# Patient Record
Sex: FEMALE
[De-identification: names, ages, dates, MRNs, and addresses within clinical notes are randomized per-mention and may not be internally consistent; named-entity substitution may affect disease eponyms.]

---

## 2024-09-06 PROBLEM — Z00.00 ENCOUNTER FOR PREVENTIVE HEALTH EXAMINATION: Status: ACTIVE | Noted: 2024-09-06

## 2024-09-09 ENCOUNTER — APPOINTMENT (OUTPATIENT)
Dept: ANTEPARTUM | Facility: CLINIC | Age: 19
End: 2024-09-09

## 2025-02-24 ENCOUNTER — INPATIENT (INPATIENT)
Facility: HOSPITAL | Age: 20
LOS: 2 days | Discharge: HOME | End: 2025-02-27
Attending: OBSTETRICS & GYNECOLOGY | Admitting: OBSTETRICS & GYNECOLOGY
Payer: COMMERCIAL

## 2025-02-24 VITALS
WEIGHT: 195.11 LBS | DIASTOLIC BLOOD PRESSURE: 82 MMHG | TEMPERATURE: 99 F | SYSTOLIC BLOOD PRESSURE: 133 MMHG | HEIGHT: 66 IN | HEART RATE: 67 BPM | RESPIRATION RATE: 18 BRPM

## 2025-02-24 DIAGNOSIS — Z34.90 ENCOUNTER FOR SUPERVISION OF NORMAL PREGNANCY, UNSPECIFIED, UNSPECIFIED TRIMESTER: ICD-10-CM

## 2025-02-24 DIAGNOSIS — O26.899 OTHER SPECIFIED PREGNANCY RELATED CONDITIONS, UNSPECIFIED TRIMESTER: ICD-10-CM

## 2025-02-24 DIAGNOSIS — Z34.80 ENCOUNTER FOR SUPERVISION OF OTHER NORMAL PREGNANCY, UNSPECIFIED TRIMESTER: ICD-10-CM

## 2025-02-24 LAB
APTT BLD: 23.8 SEC — LOW (ref 24.5–35.6)
BASOPHILS # BLD AUTO: 0 K/UL — SIGNIFICANT CHANGE UP (ref 0–0.2)
BASOPHILS NFR BLD AUTO: 0 % — SIGNIFICANT CHANGE UP (ref 0–2)
BLD GP AB SCN SERPL QL: SIGNIFICANT CHANGE UP
EOSINOPHIL # BLD AUTO: 0 K/UL — SIGNIFICANT CHANGE UP (ref 0–0.5)
EOSINOPHIL NFR BLD AUTO: 0 % — SIGNIFICANT CHANGE UP (ref 0–6)
FIBRINOGEN PPP-MCNC: 501 MG/DL — HIGH (ref 200–475)
HCT VFR BLD CALC: 34.8 % — SIGNIFICANT CHANGE UP (ref 34.5–45)
HGB BLD-MCNC: 11.2 G/DL — LOW (ref 11.5–15.5)
INR BLD: 0.89 RATIO — SIGNIFICANT CHANGE UP (ref 0.85–1.16)
LYMPHOCYTES # BLD AUTO: 1.78 K/UL — SIGNIFICANT CHANGE UP (ref 1–3.3)
LYMPHOCYTES # BLD AUTO: 18 % — SIGNIFICANT CHANGE UP (ref 13–44)
MANUAL SMEAR VERIFICATION: SIGNIFICANT CHANGE UP
MCHC RBC-ENTMCNC: 28.2 PG — SIGNIFICANT CHANGE UP (ref 27–34)
MCHC RBC-ENTMCNC: 32.2 G/DL — SIGNIFICANT CHANGE UP (ref 32–36)
MCV RBC AUTO: 87.7 FL — SIGNIFICANT CHANGE UP (ref 80–100)
MONOCYTES # BLD AUTO: 1.09 K/UL — HIGH (ref 0–0.9)
MONOCYTES NFR BLD AUTO: 11 % — SIGNIFICANT CHANGE UP (ref 2–14)
NEUTROPHILS # BLD AUTO: 7.02 K/UL — SIGNIFICANT CHANGE UP (ref 1.8–7.4)
NEUTROPHILS NFR BLD AUTO: 71 % — SIGNIFICANT CHANGE UP (ref 43–77)
NRBC # BLD: 0 /100 WBCS — SIGNIFICANT CHANGE UP (ref 0–0)
NRBC BLD-RTO: 0 /100 WBCS — SIGNIFICANT CHANGE UP (ref 0–0)
PLAT MORPH BLD: NORMAL — SIGNIFICANT CHANGE UP
PLATELET # BLD AUTO: 231 K/UL — SIGNIFICANT CHANGE UP (ref 150–400)
PROTHROM AB SERPL-ACNC: 10.4 SEC — SIGNIFICANT CHANGE UP (ref 9.9–13.4)
RBC # BLD: 3.97 M/UL — SIGNIFICANT CHANGE UP (ref 3.8–5.2)
RBC # FLD: 13.3 % — SIGNIFICANT CHANGE UP (ref 10.3–14.5)
RBC BLD AUTO: NORMAL — SIGNIFICANT CHANGE UP
WBC # BLD: 9.89 K/UL — SIGNIFICANT CHANGE UP (ref 3.8–10.5)
WBC # FLD AUTO: 9.89 K/UL — SIGNIFICANT CHANGE UP (ref 3.8–10.5)

## 2025-02-24 RX ORDER — OXYTOCIN-SODIUM CHLORIDE 0.9% IV SOLN 30 UNIT/500ML 30-0.9/5 UT/ML-%
167 SOLUTION INTRAVENOUS
Qty: 30 | Refills: 0 | Status: COMPLETED | OUTPATIENT
Start: 2025-02-24

## 2025-02-24 RX ORDER — SODIUM CHLORIDE 9 G/1000ML
1000 INJECTION, SOLUTION INTRAVENOUS
Refills: 0 | Status: DISCONTINUED | OUTPATIENT
Start: 2025-02-24 | End: 2025-02-26

## 2025-02-24 RX ORDER — INFLUENZA A VIRUS A/IDAHO/07/2018 (H1N1) ANTIGEN (MDCK CELL DERIVED, PROPIOLACTONE INACTIVATED, INFLUENZA A VIRUS A/INDIANA/08/2018 (H3N2) ANTIGEN (MDCK CELL DERIVED, PROPIOLACTONE INACTIVATED), INFLUENZA B VIRUS B/SINGAPORE/INFTT-16-0610/2016 ANTIGEN (MDCK CELL DERIVED, PROPIOLACTONE INACTIVATED), INFLUENZA B VIRUS B/IOWA/06/2017 ANTIGEN (MDCK CELL DERIVED, PROPIOLACTONE INACTIVATED) 15; 15; 15; 15 UG/.5ML; UG/.5ML; UG/.5ML; UG/.5ML
0.5 INJECTION, SUSPENSION INTRAMUSCULAR ONCE
Refills: 0 | Status: DISCONTINUED | OUTPATIENT
Start: 2025-02-24 | End: 2025-02-27

## 2025-02-24 RX ORDER — CITRIC ACID/SODIUM CITRATE 300-500 MG
15 SOLUTION, ORAL ORAL EVERY 6 HOURS
Refills: 0 | Status: DISCONTINUED | OUTPATIENT
Start: 2025-02-24 | End: 2025-02-26

## 2025-02-24 RX ORDER — AMPICILLIN SODIUM 1 G/1
2 INJECTION, POWDER, FOR SOLUTION INTRAMUSCULAR; INTRAVENOUS ONCE
Refills: 0 | Status: COMPLETED | OUTPATIENT
Start: 2025-02-24 | End: 2025-02-25

## 2025-02-24 RX ORDER — AMPICILLIN SODIUM 1 G/1
1 INJECTION, POWDER, FOR SOLUTION INTRAMUSCULAR; INTRAVENOUS EVERY 4 HOURS
Refills: 0 | Status: DISCONTINUED | OUTPATIENT
Start: 2025-02-24 | End: 2025-02-26

## 2025-02-24 NOTE — OB RN PATIENT PROFILE - NS TRANSFER PATIENT BELONGINGS
HUB to read    PA was denied for FreeStyle Bonifacio 2 Sensor    FREESTYLE BONIFACIO 2 continuous glucose monitor system (, transmitter, and sensor) is  denied for not meeting the prior authorization requirement(s). Product authorization requires  the following:  (1) One of the following:  (A) You require multiple (three or more) daily doses of insulin.  (B) You require treatment with a continuous subcutaneous insulin infusion pump   None

## 2025-02-24 NOTE — OB PROVIDER H&P - PROBLEM SELECTOR PLAN 1
-admit   -informed consent signed   -admission labs ordered   -pain mgmt per request    -continue close fetal maternal monitoring  -case d/w and NST reviewed Dr. Barahona

## 2025-02-24 NOTE — OB RN PATIENT PROFILE - THE IMPORTANCE OF THE NEWBORN'S COMFORT AND THERMOREGULATION DURING SKIN TO SKIN: ANY PART OF INFANT SKIN NOT TOUCHING PARENT'S SKIN IS TO BE COVERED BY A BLANKET.
Pc with pt pain is about the same. Discussed xray results.    Patient refused to answer the question. Statement Selected

## 2025-02-24 NOTE — OB PROVIDER H&P - HISTORY OF PRESENT ILLNESS
Supervised by CLIFF Diaz.    20 y/o  at 40.1 wks LMP 24 SAVANNAH 25 presents to L&D for scheduled induction of labor for post-dates. Pt states to feel well, offers no acute complaints. Pt admits to +FM. Denies contractions, leakage of fluids and vaginal bleeding.   Last meal: 3PM-- sandwich, banana pudding, coke    PNC: [facility], anemia in pregnancy  POb/GynHx:   -g1 TOP  D&C  -denies fibroid, ovaries cysts, h/o STD's     Allergies: NKA  Meds: PNV, iron  PMHx: anemia  PSHx: D&C   PsocHx: denies smoking/ETOH use/drug use   Psych: h/o anxiety and depression, on meds prior to pregnancy

## 2025-02-24 NOTE — OB PROVIDER H&P - NSHPPHYSICALEXAM_GEN_ALL_CORE
Gen: AAOx3, NAD, resting comfortably   Abd: soft, NT, gravid  Ext: no edema  FH: baseline 130, moderate variability, +accels, no decels  Bock: irreg     SVE: 0/0/-3/vtx/int    Bedside sono: cephalic

## 2025-02-24 NOTE — OB RN PATIENT PROFILE - NSSDOHFOODLAST_OBGYN_A_OB
DISPLAY PLAN FREE TEXT DISPLAY PLAN FREE TEXT DISPLAY PLAN FREE TEXT DISPLAY PLAN FREE TEXT never true

## 2025-02-24 NOTE — OB PROVIDER H&P - ASSESSMENT
Left a message that this is a follow up on reduction strategy and medication use. Left my name Kristyn Remy and phone number [phone].     
A/P: 20 y/o  here for scheduled induction of labor for post-dates  stable, without complaints at this time    -admit   -informed consent signed   -admission labs ordered   -pain mgmt per request    -continue close fetal maternal monitoring  -case d/w and NST reviewed Dr. Barahona

## 2025-02-25 RX ORDER — OXYTOCIN-SODIUM CHLORIDE 0.9% IV SOLN 30 UNIT/500ML 30-0.9/5 UT/ML-%
SOLUTION INTRAVENOUS
Qty: 30 | Refills: 0 | Status: DISCONTINUED | OUTPATIENT
Start: 2025-02-25 | End: 2025-02-26

## 2025-02-25 RX ADMIN — AMPICILLIN SODIUM 100 GRAM(S): 1 INJECTION, POWDER, FOR SOLUTION INTRAMUSCULAR; INTRAVENOUS at 23:53

## 2025-02-25 RX ADMIN — AMPICILLIN SODIUM 100 GRAM(S): 1 INJECTION, POWDER, FOR SOLUTION INTRAMUSCULAR; INTRAVENOUS at 15:06

## 2025-02-25 RX ADMIN — OXYTOCIN-SODIUM CHLORIDE 0.9% IV SOLN 30 UNIT/500ML 2 MILLIUNIT(S)/MIN: 30-0.9/5 SOLUTION at 22:07

## 2025-02-25 RX ADMIN — AMPICILLIN SODIUM 100 GRAM(S): 1 INJECTION, POWDER, FOR SOLUTION INTRAMUSCULAR; INTRAVENOUS at 19:46

## 2025-02-25 RX ADMIN — AMPICILLIN SODIUM 200 GRAM(S): 1 INJECTION, POWDER, FOR SOLUTION INTRAMUSCULAR; INTRAVENOUS at 11:19

## 2025-02-25 NOTE — OB PROVIDER LABOR PROGRESS NOTE - ASSESSMENT
18y/o  at 40.2wks 18y/o  at 40.2wks    plan - start pitocin   epidural in place and working  Dr. starkey was present in room.

## 2025-02-25 NOTE — OB PROVIDER LABOR PROGRESS NOTE - ASSESSMENT
20 y/o  at 40.1, here for scheduled IOL for post dates.     - s/p buccal cytotec #2 given   - continue buccal cytotec as per protocol  - continue close fetal maternal monitoring    - d/w Dr. Barahona  18 y/o  at 40.1, here for scheduled IOL for post dates. GBS+    - s/p buccal cytotec #2 given   - continue buccal cytotec as per protocol  - continue close fetal maternal monitoring    - Ampicillin for GBS prophylaxis  - d/w Dr. Barahona  18 y/o  at 40.2, here for scheduled IOL for post dates. GBS+    - s/p buccal cytotec #2 given   - continue buccal cytotec as per protocol  - continue close fetal maternal monitoring    - Ampicillin for GBS prophylaxis  - d/w Dr. Barahona

## 2025-02-25 NOTE — OB PROVIDER LABOR PROGRESS NOTE - ASSESSMENT
18 y/o  at 40.2, admitted for scheduled IOL for post dates. GBS+  cervical balloon in place     - continue buccal cytotec as per protocol  - continue close fetal maternal monitoring    - Ampicillin for GBS prophylaxis  - pain management per patient request   - NST reviewed   - d/w Dr. Toure, house attending

## 2025-02-25 NOTE — OB PROVIDER LABOR PROGRESS NOTE - ASSESSMENT
a/p: 20 y/o  at 40.1 here for scheduled IOL  stable     s/p buccal cytotec #1 given   continue close fetal maternal monitoring    d/w Dr. Barahona

## 2025-02-25 NOTE — OB PROVIDER LABOR PROGRESS NOTE - ASSESSMENT
20 y/o  at 40.2, admitted for scheduled IOL for post dates. GBS+  patient agreeable to cervical balloon placement but would like to eat first     - continue buccal cytotec as per protocol, if contractions space out   - continue close fetal maternal monitoring    - Ampicillin for GBS prophylaxis  - pain management per patient request   - will return for cervical balloon placement   - NST reviewed   - d/w Dr. Toure, house attending

## 2025-02-25 NOTE — OB PROVIDER LABOR PROGRESS NOTE - ASSESSMENT
20 y/o  at 40.2, admitted for scheduled IOL for post dates. GBS+  cervical balloon in place     - continue buccal cytotec as per protocol  - continue close fetal maternal monitoring    - Ampicillin for GBS prophylaxis  - pain management with epidural   - NST reviewed   - d/w Dr. Toure, house attending

## 2025-02-26 LAB
BASOPHILS # BLD AUTO: 0.03 K/UL — SIGNIFICANT CHANGE UP (ref 0–0.2)
BASOPHILS NFR BLD AUTO: 0.2 % — SIGNIFICANT CHANGE UP (ref 0–2)
EOSINOPHIL # BLD AUTO: 0.01 K/UL — SIGNIFICANT CHANGE UP (ref 0–0.5)
EOSINOPHIL NFR BLD AUTO: 0.1 % — SIGNIFICANT CHANGE UP (ref 0–6)
HCT VFR BLD CALC: 28.8 % — LOW (ref 34.5–45)
HGB BLD-MCNC: 9.6 G/DL — LOW (ref 11.5–15.5)
IMM GRANULOCYTES NFR BLD AUTO: 1 % — HIGH (ref 0–0.9)
LYMPHOCYTES # BLD AUTO: 1.57 K/UL — SIGNIFICANT CHANGE UP (ref 1–3.3)
LYMPHOCYTES # BLD AUTO: 8.6 % — LOW (ref 13–44)
MCHC RBC-ENTMCNC: 29.2 PG — SIGNIFICANT CHANGE UP (ref 27–34)
MCHC RBC-ENTMCNC: 33.3 G/DL — SIGNIFICANT CHANGE UP (ref 32–36)
MCV RBC AUTO: 87.5 FL — SIGNIFICANT CHANGE UP (ref 80–100)
MONOCYTES # BLD AUTO: 1.84 K/UL — HIGH (ref 0–0.9)
MONOCYTES NFR BLD AUTO: 10.1 % — SIGNIFICANT CHANGE UP (ref 2–14)
NEUTROPHILS # BLD AUTO: 14.64 K/UL — HIGH (ref 1.8–7.4)
NEUTROPHILS NFR BLD AUTO: 80 % — HIGH (ref 43–77)
NRBC BLD AUTO-RTO: 0 /100 WBCS — SIGNIFICANT CHANGE UP (ref 0–0)
PLATELET # BLD AUTO: 224 K/UL — SIGNIFICANT CHANGE UP (ref 150–400)
RBC # BLD: 3.29 M/UL — LOW (ref 3.8–5.2)
RBC # FLD: 13.7 % — SIGNIFICANT CHANGE UP (ref 10.3–14.5)
T PALLIDUM AB TITR SER: NEGATIVE — SIGNIFICANT CHANGE UP
WBC # BLD: 18.28 K/UL — HIGH (ref 3.8–10.5)
WBC # FLD AUTO: 18.28 K/UL — HIGH (ref 3.8–10.5)

## 2025-02-26 RX ORDER — IBUPROFEN 200 MG
600 TABLET ORAL EVERY 6 HOURS
Refills: 0 | Status: COMPLETED | OUTPATIENT
Start: 2025-02-26 | End: 2026-01-25

## 2025-02-26 RX ORDER — SIMETHICONE 80 MG
80 TABLET,CHEWABLE ORAL EVERY 4 HOURS
Refills: 0 | Status: DISCONTINUED | OUTPATIENT
Start: 2025-02-26 | End: 2025-02-27

## 2025-02-26 RX ORDER — FERROUS SULFATE 137(45) MG
325 TABLET, EXTENDED RELEASE ORAL THREE TIMES A DAY
Refills: 0 | Status: DISCONTINUED | OUTPATIENT
Start: 2025-02-26 | End: 2025-02-27

## 2025-02-26 RX ORDER — MAGNESIUM HYDROXIDE 400 MG/5ML
30 SUSPENSION ORAL DAILY
Refills: 0 | Status: DISCONTINUED | OUTPATIENT
Start: 2025-02-26 | End: 2025-02-27

## 2025-02-26 RX ORDER — KETOROLAC TROMETHAMINE 30 MG/ML
30 INJECTION, SOLUTION INTRAMUSCULAR; INTRAVENOUS ONCE
Refills: 0 | Status: DISCONTINUED | OUTPATIENT
Start: 2025-02-26 | End: 2025-02-26

## 2025-02-26 RX ORDER — OXYTOCIN-SODIUM CHLORIDE 0.9% IV SOLN 30 UNIT/500ML 30-0.9/5 UT/ML-%
167 SOLUTION INTRAVENOUS
Qty: 30 | Refills: 0 | Status: COMPLETED | OUTPATIENT
Start: 2025-02-26 | End: 2025-02-26

## 2025-02-26 RX ORDER — HYDROCORTISONE 10 MG/G
1 CREAM TOPICAL EVERY 6 HOURS
Refills: 0 | Status: DISCONTINUED | OUTPATIENT
Start: 2025-02-26 | End: 2025-02-27

## 2025-02-26 RX ORDER — DIBUCAINE 10 MG/G
1 OINTMENT TOPICAL EVERY 6 HOURS
Refills: 0 | Status: DISCONTINUED | OUTPATIENT
Start: 2025-02-26 | End: 2025-02-27

## 2025-02-26 RX ORDER — BISACODYL 5 MG
5 TABLET, DELAYED RELEASE (ENTERIC COATED) ORAL EVERY 12 HOURS
Refills: 0 | Status: DISCONTINUED | OUTPATIENT
Start: 2025-02-26 | End: 2025-02-27

## 2025-02-26 RX ORDER — ACETAMINOPHEN 500 MG/5ML
975 LIQUID (ML) ORAL
Refills: 0 | Status: DISCONTINUED | OUTPATIENT
Start: 2025-02-26 | End: 2025-02-27

## 2025-02-26 RX ORDER — PRAMOXINE HCL 1 %
1 GEL (GRAM) TOPICAL EVERY 4 HOURS
Refills: 0 | Status: DISCONTINUED | OUTPATIENT
Start: 2025-02-26 | End: 2025-02-27

## 2025-02-26 RX ORDER — MODIFIED LANOLIN 100 %
1 CREAM (GRAM) TOPICAL EVERY 6 HOURS
Refills: 0 | Status: DISCONTINUED | OUTPATIENT
Start: 2025-02-26 | End: 2025-02-27

## 2025-02-26 RX ORDER — MAGNESIUM HYDROXIDE 400 MG/5ML
30 SUSPENSION ORAL
Refills: 0 | Status: DISCONTINUED | OUTPATIENT
Start: 2025-02-26 | End: 2025-02-27

## 2025-02-26 RX ORDER — OXYTOCIN-SODIUM CHLORIDE 0.9% IV SOLN 30 UNIT/500ML 30-0.9/5 UT/ML-%
167 SOLUTION INTRAVENOUS
Qty: 30 | Refills: 0 | Status: DISCONTINUED | OUTPATIENT
Start: 2025-02-26 | End: 2025-02-27

## 2025-02-26 RX ORDER — WITCH HAZEL LEAF
1 FLUID EXTRACT MISCELLANEOUS EVERY 4 HOURS
Refills: 0 | Status: DISCONTINUED | OUTPATIENT
Start: 2025-02-26 | End: 2025-02-27

## 2025-02-26 RX ORDER — CLOSTRIDIUM TETANI TOXOID ANTIGEN (FORMALDEHYDE INACTIVATED), CORYNEBACTERIUM DIPHTHERIAE TOXOID ANTIGEN (FORMALDEHYDE INACTIVATED), BORDETELLA PERTUSSIS TOXOID ANTIGEN (GLUTARALDEHYDE INACTIVATED), BORDETELLA PERTUSSIS FILAMENTOUS HEMAGGLUTININ ANTIGEN (FORMALDEHYDE INACTIVATED), BORDETELLA PERTUSSIS PERTACTIN ANTIGEN, AND BORDETELLA PERTUSSIS FIMBRIAE 2/3 ANTIGEN 5; 2; 2.5; 5; 3; 5 [LF]/.5ML; [LF]/.5ML; UG/.5ML; UG/.5ML; UG/.5ML; UG/.5ML
0.5 INJECTION, SUSPENSION INTRAMUSCULAR ONCE
Refills: 0 | Status: DISCONTINUED | OUTPATIENT
Start: 2025-02-26 | End: 2025-02-27

## 2025-02-26 RX ORDER — PRENATAL 136/IRON/FOLIC ACID 27 MG-1 MG
1 TABLET ORAL DAILY
Refills: 0 | Status: DISCONTINUED | OUTPATIENT
Start: 2025-02-26 | End: 2025-02-27

## 2025-02-26 RX ORDER — DIPHENHYDRAMINE HCL 12.5MG/5ML
25 ELIXIR ORAL EVERY 6 HOURS
Refills: 0 | Status: DISCONTINUED | OUTPATIENT
Start: 2025-02-26 | End: 2025-02-27

## 2025-02-26 RX ORDER — IBUPROFEN 200 MG
600 TABLET ORAL EVERY 6 HOURS
Refills: 0 | Status: DISCONTINUED | OUTPATIENT
Start: 2025-02-26 | End: 2025-02-27

## 2025-02-26 RX ORDER — BENZOCAINE 220 MG/G
1 SPRAY, METERED PERIODONTAL EVERY 6 HOURS
Refills: 0 | Status: DISCONTINUED | OUTPATIENT
Start: 2025-02-26 | End: 2025-02-27

## 2025-02-26 RX ADMIN — AMPICILLIN SODIUM 100 GRAM(S): 1 INJECTION, POWDER, FOR SOLUTION INTRAMUSCULAR; INTRAVENOUS at 08:04

## 2025-02-26 RX ADMIN — Medication 1 APPLICATION(S): at 13:53

## 2025-02-26 RX ADMIN — Medication 975 MILLIGRAM(S): at 22:05

## 2025-02-26 RX ADMIN — HYDROCORTISONE 1 APPLICATION(S): 10 CREAM TOPICAL at 13:52

## 2025-02-26 RX ADMIN — KETOROLAC TROMETHAMINE 30 MILLIGRAM(S): 30 INJECTION, SOLUTION INTRAMUSCULAR; INTRAVENOUS at 10:51

## 2025-02-26 RX ADMIN — Medication 3 MILLILITER(S): at 14:21

## 2025-02-26 RX ADMIN — OXYTOCIN-SODIUM CHLORIDE 0.9% IV SOLN 30 UNIT/500ML 167 MILLIUNIT(S)/MIN: 30-0.9/5 SOLUTION at 09:54

## 2025-02-26 RX ADMIN — Medication 500 MILLIGRAM(S): at 23:12

## 2025-02-26 RX ADMIN — Medication 325 MILLIGRAM(S): at 13:54

## 2025-02-26 RX ADMIN — BENZOCAINE 1 SPRAY(S): 220 SPRAY, METERED PERIODONTAL at 13:52

## 2025-02-26 RX ADMIN — Medication 5 MILLIGRAM(S): at 18:52

## 2025-02-26 RX ADMIN — Medication 1 APPLICATION(S): at 13:52

## 2025-02-26 RX ADMIN — AMPICILLIN SODIUM 100 GRAM(S): 1 INJECTION, POWDER, FOR SOLUTION INTRAMUSCULAR; INTRAVENOUS at 03:58

## 2025-02-26 RX ADMIN — Medication 1 TABLET(S): at 13:54

## 2025-02-26 RX ADMIN — KETOROLAC TROMETHAMINE 30 MILLIGRAM(S): 30 INJECTION, SOLUTION INTRAMUSCULAR; INTRAVENOUS at 10:36

## 2025-02-26 RX ADMIN — Medication 325 MILLIGRAM(S): at 23:12

## 2025-02-26 RX ADMIN — DIBUCAINE 1 APPLICATION(S): 10 OINTMENT TOPICAL at 13:53

## 2025-02-26 RX ADMIN — Medication 500 MILLIGRAM(S): at 13:54

## 2025-02-26 RX ADMIN — MAGNESIUM HYDROXIDE 30 MILLILITER(S): 400 SUSPENSION ORAL at 13:52

## 2025-02-26 RX ADMIN — Medication 3 MILLILITER(S): at 22:44

## 2025-02-26 RX ADMIN — Medication 600 MILLIGRAM(S): at 23:11

## 2025-02-26 RX ADMIN — Medication 975 MILLIGRAM(S): at 21:05

## 2025-02-26 NOTE — OB PROVIDER LABOR PROGRESS NOTE - NS_SUBJECTIVE/OBJECTIVE_OBGYN_ALL_OB_FT
Cooks balloon placed   80cc/80cc sterile saline in uterine and vaginal balloon   patient tolerated exam well   will continue to monitor     FHT: baseline: 140, moderate variability, + accels, no decels  q 2-3 minutes
Nst: moderate variability accels no decels  130  Pt resting in bed  toco- ctx q 3 min
Patient evaluated at bedside   Resting comfortably with epidural in place   cervical balloon still in place     VE deferred
Patient reevaluated at bedside c/o pressure and pain  pitocin at 10mu  /66  sve 7/100/-1/vtx/iupc in place  nst baseline 140, moderate variability, early decel  toco irregular
Supervised by CLIFF amaro tracing note
routine ob huddle    20yo  induced for dates    last vag exam: /-1/vtx     arom at 0118 meconium stained    pt has iupc    pit at 12mu    gbs + on iv amp    cat I fetal tracing    toco via iupc     - due for vag exam
Pt resting on left lateral with peanut ball feeling some pain to rt side.    fhrt-130 moderate variability accels no decels    cx- 6/70/-3/vtx/int  arom - moderate mec  iupc placed
pt reports some cramps
Patient evaluated at bedside   resting comfortably   no complaints at this time     cervical balloon in place  VE deferred
Tracing Note
pt comfortable
Supervised by CLIFF Barnett     Pt was seen at bedside, lying uncomfortably in labor pains    SVE: 10/100/+2
Patient evaluated at bedside   resting comfortably   reports some contractions pain   otherwise no new complaints     SVE: 1-2/70/-3  patient tolerated exam well
pt resting in bed offers no complains  pt has a cook balloon in place  fhrt- moderate variabililty accels no decels  125  cx- 5.5/70/-2/vtx/int  balloon was out  toco- ctx irregular

## 2025-02-26 NOTE — CHART NOTE - NSCHARTNOTEFT_GEN_A_CORE
ATTENDING NOTE    Pt is examined because she is having category ii tracing.  cx:9/100%/vtx/-2-3/ruptured earlier with meconium fluid  ucs q 2-3 min;pitocin is running.  category II tracing however with good variability  efw about 4000gms  A/P 20 yo primigravida at 40+ weeks gestation in active labour almost in second stage.  discussed with patient and  the possibility of a c/s if she does not progress or if the tracing persists to be category ii  Continue maternal/fetal surveillance

## 2025-02-26 NOTE — OB PROVIDER LABOR PROGRESS NOTE - NS_OBIHICONTRACTIONPATTERNDETAILS_OBGYN_ALL_OB_FT
ctx none
q 2-3 min mild
q 2-3 minutes
ctx none
q 2 mins
3 min
q 2-3
q1-2
q 5-6 mins
q 1-2 minutes
irregular

## 2025-02-26 NOTE — OB PROVIDER LABOR PROGRESS NOTE - ASSESSMENT
med iol for  term pregnancy    plan- continue pitocin titrate up til ctx q 2- 3 min   next exam AROM poss iupc  discussed with Dr. Fishman

## 2025-02-26 NOTE — OB RN DELIVERY SUMMARY - NS_RESUSCITMEDS_OBGYN_ALL_OB
----- Message from Isael Forbes sent at 8/2/2017  9:15 AM CDT -----  Contact: self [phone]  Patient would like a call back from the office , stated it's personal . Please advise , Thanks !    No

## 2025-02-26 NOTE — OB RN DELIVERY SUMMARY - NS_CORDVESSELS_OBGYN_ALL_OB
Mother updated she will be transferred to [facility] in [place]   3

## 2025-02-26 NOTE — OB PROVIDER LABOR PROGRESS NOTE - NS_OBIHIFHRDETAILS_OBGYN_ALL_OB_FT
130s baseline mod variability
130
Cat 1 tracing  Baseline 130  Moderate variability  + accels, no decels
FHT: baseline: 130, moderate variability, + accels, no decels
baseline fhr 145  moderate variability   accels present   no decels
FHT: baseline: 140, moderate variability, + accels, no decels
cat I
125
130
FHT: baseline: 130, moderate variability, + accels, no decels
baseline fhr: 135  moderate variability   accels present   no decels

## 2025-02-26 NOTE — OB PROVIDER LABOR PROGRESS NOTE - ASSESSMENT
med iol at 40.2wks    plan continue pitocin titration up to 210 mvu    IUPC placed without difficulty    Dr. Fishman made aware

## 2025-02-26 NOTE — OB PROVIDER DELIVERY SUMMARY - NSPROVIDERDELIVERYNOTE_OBGYN_ALL_OB_FT
Delivered per vagina liveborn watson apgar 8/9.  Delayed cord clamping performed.  Cord for gas and cord blood obtained.  Placenta spontaneously  and delivered intact.  Uterus firm.  Third degree laceration repaired with chromic suture in layers.  Mother and baby stable.  Skin to skin initiated.

## 2025-02-26 NOTE — OB PROVIDER LABOR PROGRESS NOTE - ASSESSMENT
continue pitocin augmentation  reposition with peanut ball  venodynes  npo  continuous monitoring  anesthesia for top off  amp for gbs pOS  dw Dr. Fishman [place] attending

## 2025-02-26 NOTE — OB PROVIDER LABOR PROGRESS NOTE - ASSESSMENT
18 y/o  40.3 wks here for mIOL for postdates     -stable   -continue fetal/maternal monitoring   -d/w dr. Barahona   -anticipate vaginal delivery

## 2025-02-27 ENCOUNTER — TRANSCRIPTION ENCOUNTER (OUTPATIENT)
Age: 20
End: 2025-02-27

## 2025-02-27 VITALS
HEART RATE: 92 BPM | SYSTOLIC BLOOD PRESSURE: 107 MMHG | OXYGEN SATURATION: 98 % | RESPIRATION RATE: 18 BRPM | DIASTOLIC BLOOD PRESSURE: 67 MMHG | TEMPERATURE: 98 F

## 2025-02-27 LAB
BASOPHILS # BLD AUTO: 0.05 K/UL — SIGNIFICANT CHANGE UP (ref 0–0.2)
BASOPHILS NFR BLD AUTO: 0.3 % — SIGNIFICANT CHANGE UP (ref 0–2)
EOSINOPHIL # BLD AUTO: 0.1 K/UL — SIGNIFICANT CHANGE UP (ref 0–0.5)
EOSINOPHIL NFR BLD AUTO: 0.6 % — SIGNIFICANT CHANGE UP (ref 0–6)
HCT VFR BLD CALC: 29.3 % — LOW (ref 34.5–45)
HGB BLD-MCNC: 9.5 G/DL — LOW (ref 11.5–15.5)
IMM GRANULOCYTES NFR BLD AUTO: 1.9 % — HIGH (ref 0–0.9)
LYMPHOCYTES # BLD AUTO: 17.3 % — SIGNIFICANT CHANGE UP (ref 13–44)
LYMPHOCYTES # BLD AUTO: 2.88 K/UL — SIGNIFICANT CHANGE UP (ref 1–3.3)
MCHC RBC-ENTMCNC: 28.5 PG — SIGNIFICANT CHANGE UP (ref 27–34)
MCHC RBC-ENTMCNC: 32.4 G/DL — SIGNIFICANT CHANGE UP (ref 32–36)
MCV RBC AUTO: 88 FL — SIGNIFICANT CHANGE UP (ref 80–100)
MONOCYTES # BLD AUTO: 1.63 K/UL — HIGH (ref 0–0.9)
MONOCYTES NFR BLD AUTO: 9.8 % — SIGNIFICANT CHANGE UP (ref 2–14)
NEUTROPHILS # BLD AUTO: 11.7 K/UL — HIGH (ref 1.8–7.4)
NEUTROPHILS NFR BLD AUTO: 70.1 % — SIGNIFICANT CHANGE UP (ref 43–77)
NRBC BLD AUTO-RTO: 0 /100 WBCS — SIGNIFICANT CHANGE UP (ref 0–0)
PLATELET # BLD AUTO: 225 K/UL — SIGNIFICANT CHANGE UP (ref 150–400)
RBC # BLD: 3.33 M/UL — LOW (ref 3.8–5.2)
RBC # FLD: 13.6 % — SIGNIFICANT CHANGE UP (ref 10.3–14.5)
WBC # BLD: 16.68 K/UL — HIGH (ref 3.8–10.5)
WBC # FLD AUTO: 16.68 K/UL — HIGH (ref 3.8–10.5)

## 2025-02-27 PROCEDURE — 86901 BLOOD TYPING SEROLOGIC RH(D): CPT

## 2025-02-27 PROCEDURE — 86923 COMPATIBILITY TEST ELECTRIC: CPT

## 2025-02-27 PROCEDURE — 85025 COMPLETE CBC W/AUTO DIFF WBC: CPT

## 2025-02-27 PROCEDURE — 90707 MMR VACCINE SC: CPT

## 2025-02-27 PROCEDURE — C1726: CPT

## 2025-02-27 PROCEDURE — 86780 TREPONEMA PALLIDUM: CPT

## 2025-02-27 PROCEDURE — 85384 FIBRINOGEN ACTIVITY: CPT

## 2025-02-27 PROCEDURE — 86900 BLOOD TYPING SEROLOGIC ABO: CPT

## 2025-02-27 PROCEDURE — 36415 COLL VENOUS BLD VENIPUNCTURE: CPT

## 2025-02-27 PROCEDURE — 86850 RBC ANTIBODY SCREEN: CPT

## 2025-02-27 PROCEDURE — 59050 FETAL MONITOR W/REPORT: CPT

## 2025-02-27 PROCEDURE — 85610 PROTHROMBIN TIME: CPT

## 2025-02-27 PROCEDURE — 85730 THROMBOPLASTIN TIME PARTIAL: CPT

## 2025-02-27 RX ORDER — ACETAMINOPHEN 500 MG/5ML
2 LIQUID (ML) ORAL
Qty: 30 | Refills: 0
Start: 2025-02-27 | End: 2025-03-05

## 2025-02-27 RX ORDER — PRENATAL 136/IRON/FOLIC ACID 27 MG-1 MG
1 TABLET ORAL
Qty: 30 | Refills: 2
Start: 2025-02-27 | End: 2025-05-27

## 2025-02-27 RX ORDER — DOCUSATE SODIUM 100 MG
1 CAPSULE ORAL
Qty: 28 | Refills: 0
Start: 2025-02-27 | End: 2025-03-12

## 2025-02-27 RX ORDER — MEASLES,MUMPS,RUBELLA LIVE VAC 20000/0.5
0.5 VIAL (EA) SUBCUTANEOUS ONCE
Refills: 0 | Status: COMPLETED | OUTPATIENT
Start: 2025-02-27 | End: 2025-02-27

## 2025-02-27 RX ORDER — IBUPROFEN 200 MG
1 TABLET ORAL
Qty: 28 | Refills: 0
Start: 2025-02-27 | End: 2025-03-05

## 2025-02-27 RX ORDER — FERROUS SULFATE 137(45) MG
1 TABLET, EXTENDED RELEASE ORAL
Qty: 30 | Refills: 0
Start: 2025-02-27 | End: 2025-03-28

## 2025-02-27 RX ADMIN — Medication 975 MILLIGRAM(S): at 11:16

## 2025-02-27 RX ADMIN — Medication 3 MILLILITER(S): at 10:32

## 2025-02-27 RX ADMIN — Medication 325 MILLIGRAM(S): at 14:00

## 2025-02-27 RX ADMIN — Medication 0.5 MILLILITER(S): at 16:55

## 2025-02-27 RX ADMIN — Medication 1 TABLET(S): at 11:17

## 2025-02-27 RX ADMIN — Medication 600 MILLIGRAM(S): at 11:17

## 2025-02-27 RX ADMIN — MAGNESIUM HYDROXIDE 30 MILLILITER(S): 400 SUSPENSION ORAL at 15:22

## 2025-02-27 RX ADMIN — Medication 975 MILLIGRAM(S): at 12:16

## 2025-02-27 RX ADMIN — Medication 600 MILLIGRAM(S): at 00:11

## 2025-02-27 RX ADMIN — Medication 600 MILLIGRAM(S): at 06:15

## 2025-02-27 RX ADMIN — Medication 500 MILLIGRAM(S): at 15:24

## 2025-02-27 RX ADMIN — Medication 600 MILLIGRAM(S): at 07:15

## 2025-02-27 RX ADMIN — Medication 3 MILLILITER(S): at 06:23

## 2025-02-27 RX ADMIN — Medication 500 MILLIGRAM(S): at 06:15

## 2025-02-27 RX ADMIN — Medication 600 MILLIGRAM(S): at 12:16

## 2025-02-27 RX ADMIN — Medication 325 MILLIGRAM(S): at 06:16

## 2025-02-27 RX ADMIN — Medication 5 MILLIGRAM(S): at 06:15

## 2025-02-27 NOTE — DISCHARGE NOTE OB - FINANCIAL ASSISTANCE
[facility] provides services at a reduced cost to those who are determined to be eligible through [facility]’s financial assistance program. Information regarding [facility]’s financial assistance program can be found by going to [email or web address] or by calling [phone].

## 2025-02-27 NOTE — DISCHARGE NOTE OB - CARE PLAN
1 Principal Discharge DX:	Normal vaginal delivery  Assessment and plan of treatment:	routine vaginal delivery care, no tub baths, douching or sex for 6weeks, ambulate daily   follow up in 5-6wks with own obgyn  Secondary Diagnosis:	Laceration, obstetrical, third degree  Assessment and plan of treatment:	continue low fiber diet   continue stool softener

## 2025-02-27 NOTE — DISCHARGE NOTE OB - PATIENT PORTAL LINK FT
You can access the FollowMyHealth Patient Portal offered by [facility] by registering at the following website: [email or web address]. By joining [employer]’s FollowMyHealth portal, you will also be able to view your health information using other applications (apps) compatible with our system.

## 2025-02-27 NOTE — LACTATION INITIAL EVALUATION - LACTATION INTERVENTIONS
Parent taught benefits of breastfeeding, positioning and latch techniques, signs of good latch, milk transfer, feeding cues, cluster feedings and signs of satiety. Reinforced importance of unlimited responsive feeding (10-12x per 24 hours) and of diaper count to assess for adequate intake./initiate/review safe skin-to-skin/initiate/review hand expression/initiate/review techniques for position and latch/review techniques to increase milk supply/review techniques to manage sore nipples/engorgement/initiate/review breast massage/compression/reviewed components of an effective feeding and at least 8 effective feedings per day required/reviewed importance of monitoring infant diapers, the breastfeeding log, and minimum output each day/reviewed benefits and recommendations for rooming in/reviewed feeding on demand/by cue at least 8 times a day/reviewed indications of inadequate milk transfer that would require supplementation

## 2025-02-27 NOTE — DISCHARGE NOTE OB - MEDICATION SUMMARY - MEDICATIONS TO TAKE
I will START or STAY ON the medications listed below when I get home from the hospital:    ibuprofen 600 mg oral tablet  -- 1 tab(s) by mouth every 6 hours as needed for  moderate pain  -- Indication: For pain    Tylenol Extra Strength 500 mg oral tablet  -- 2 tab(s) by mouth every 6 hours as needed for  mild pain  -- Indication: For pain    PreNatal Low Iron oral tablet  -- 1 tab(s) by mouth once a day  -- Indication: For Supplement     ferrous sulfate 325 mg (65 mg elemental iron) oral delayed release tablet  -- 1 tab(s) by mouth once a day  -- Indication: For anemia    Colace 100 mg oral capsule  -- 1 cap(s) by mouth twice a day after breakfast and dinner as needed for  constipation  -- Indication: For Stool softener

## 2025-02-27 NOTE — PROGRESS NOTE ADULT - SUBJECTIVE AND OBJECTIVE BOX
Supervised by CLIFF Cruz      and  at bedside    to assist patient at home upon discharge    Patient seen at bedside resting comfortably offers no current complaints. Ambulating and voiding without difficulty. Passing flatus and tolerating regular diet. +BM without any pain. Taking stool softener and on low-fiber diet for 3rd degree laceration-- without pain or complaints. both breast/bottle feeding . Denies HA, CP, SOB, N/V/D, dizziness, palpitations,  worsening vaginal bleeding, or any other concerns.      Vital Signs Last 24 Hrs  T(C): 37 (2025 06:59), Max: 37.7 (2025 12:13)  T(F): 98.6 (2025 06:59), Max: 99.8 (2025 12:13)  HR: 89 (2025 06:59) (75 - 111)  BP: 109/71 (2025 06:59) (104/65 - 137/55)  BP(mean): 78 (2025 11:19) (78 - 88)  RR: 16 (2025 06:59) (15 - 18)  SpO2: 95% (2025 06:59) (95% - 99%)    Parameters below as of 2025 06:59  Patient On (Oxygen Delivery Method): room air        Physical Exam:     Gen: A&Ox 3, NAD  Abdomen: +BS, Soft, nontender, ND; Fundus firm below umbilicus  Gyn: mod lochia  Ext: Nontender, no worsening edema                          9.5    16.68 )-----------( 225      ( 2025 06:58 )             29.3

## 2025-02-27 NOTE — DISCHARGE NOTE OB - CARE PROVIDER_API CALL
Cindy Grubbs  Obstetrics and Gynecology  [address], [address]  [place] [address]  Phone: [phone]  Fax: [phone]  Established Patient  Follow Up Time: 1 month

## 2025-02-27 NOTE — DISCHARGE NOTE OB - PLAN OF CARE
routine vaginal delivery care, no tub baths, douching or sex for 6weeks, ambulate daily   follow up in 5-6wks with own obgyn continue low fiber diet   continue stool softener

## 2025-02-27 NOTE — DISCHARGE NOTE OB - HOSPITAL COURSE
Admitted for induction of labor for post-dates   Delivered vaginally   Meeting post-partum milestones   Voiding, ambulating   Stable for discharge home

## 2025-02-27 NOTE — PROGRESS NOTE ADULT - ASSESSMENT
A/P:  18 y/o  IOL at 40.3, PPD#1 s/p   -Continue pain management  -Encourage OOB and ambulation  -Check CBC  -Continue current care  -Continue low fiber diet and stool softener   -Plan for discharge tomorrow  -d/w dr Galvin